# Patient Record
(demographics unavailable — no encounter records)

---

## 2024-12-15 NOTE — ASSESSMENT
[FreeTextEntry1] : 1) Neoplasm of unknown biological significance, on the right posterior shoulder - Lesion measured as above and photo taken for clinical correlation. - Suspicious for NMSC vs. ISK - Biopsy was performed today for histologic correlation. Will contact Patient with results and plan treatment considering histologic findings. - r/o NMSC vs. ISK  Shave Biopsy: All side effects including pain, bleeding, infection, scar, recurrence, nerve damage were discussed and consent was obtained. We anesthetized the area with 1% lidocaine/epinephrine. The lesion was biopsied with a Dermablade. Hemostasis was achieved with Drysol. Wound care were provided and the tissue was submitted to pathology for evaluation.  2) Actinic keratoses, face and L ear - We have discussed the nature and usual course of these lesions.  - Given broad actinic damage, recommended field therapy. Discussed in detail. Initially, Patient declined and cryotherapy was performed with 1 cycle to actinic keratoses (#18). However, after treatment, Patient and his wife opted for field therapy.  - After the holidays, start 5-FU to affected areas BID x 2 weeks. SED. Proper use discussed. Detailed instruction sheet provided.  - The procedure was well-tolerated and without complication. - We have discussed related skin care and cryotherapy aftercare. - RTC 6 weeks after completing 5-FU for f/u with Dr. Lancaster.   3) Atypical compound melanocytic proliferation associated with SK, R lateral back PRAME immunostain positive supportive of at least MIS, with entire lesion treated as melanoma extended to 0.3mm depth) - S/p excision 1/05/2024 - [facility]DX- class 1A (low risk).  - No evidence of recurrence. Continue to monitor. No repigmentation or nodularity. No lymphadenopathy on exam as above.  - Continue to follow with ophthalmology, dentist, and urologist.  - Continue to monitor. RTC 3 mos for FBSE with Dr. Lancaster.  4) MIS, right lower back, s/p excision 5/15/2024 - No evidence of recurrence. Continue to monitor. No repigmentation or nodularity. No lymphadenopathy on exam as above.  - Continue to follow with ophthalmology, dentist, and urologist.  - Continue to monitor. RTC 3 mos for FBSE with Dr. Lancaster.  5) History of non-melanoma skin cancer, extensive (see hpi) - No evidence of recurrence at prior sites.  - Sun protective measures reinforced - C/w q3 mo FBSE as above.   6)Screening exam for skin cancer (V76.43) (Z12.83) - ABCDEs of melanoma, sun safety with OTC SPF 30+, and self-skin checks reviewed - Counseled patient to notify us of any changing lesions  RTC 3 mos for FBSE as above.

## 2024-12-15 NOTE — HISTORY OF PRESENT ILLNESS
[FreeTextEntry1] : F/u melanoma; FBSE [de-identified] : 79M with a history of MMIS on the R lateral back s/p excision, MIS on the right lower back s/p excision, multiple NMSCs, and MGUS, last seen August 2024, here for q3 month skin exam. No new, evolving, or symptomatic lesions. Presenting today with his wife.   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

## 2024-12-15 NOTE — PHYSICAL EXAM
[Alert] : alert [Oriented x 3] : ~L oriented x 3 [Well Nourished] : well nourished [Conjunctiva Non-injected] : conjunctiva non-injected [No Visual Lymphadenopathy] : no visual  lymphadenopathy [No Clubbing] : no clubbing [No Edema] : no edema [No Bromhidrosis] : no bromhidrosis [Full Body Skin Exam Performed] : performed [No Chromhidrosis] : no chromhidrosis [FreeTextEntry3] : - 0.4 cm x 0.6 cm yellow, hyperkeratotic papule on the right posterior shoulder on an erythematous base (see photo, 12/15/2024) - Scattered, poorly circumscribed red erythematous, rough papules on the face, L ear helical root and superior antihelix - Many linear scars on the face and neck without pigmentation or nodularity - Several scattered, red, partially blanching papules on the trunk and extremities. - Scattered, well-demarcated stuck-on appearing brown plaques and papules on the trunk and extremities. - Fairly uniform and regular brown macules and papules on the trunk and extremities. - Linear scar R lower back w/o pigmentation or nodularity - Linear scar R lateral back w/o pigmentation or nodularity - No palpable lymphadenopathy of cervical, occipital, mandibular, clavicular, axillary, or inguinal lymph nodes.

## 2024-12-15 NOTE — HISTORY OF PRESENT ILLNESS
[FreeTextEntry1] : F/u melanoma; FBSE [de-identified] : 79M with a history of MMIS on the R lateral back s/p excision, MIS on the right lower back s/p excision, multiple NMSCs, and MGUS, last seen August 2024, here for q3 month skin exam. No new, evolving, or symptomatic lesions. Presenting today with his wife.   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

## 2025-02-10 NOTE — PHYSICAL EXAM
[Alert] : alert [Oriented x 3] : ~L oriented x 3 [Well Nourished] : well nourished [Conjunctiva Non-injected] : conjunctiva non-injected [No Visual Lymphadenopathy] : no visual  lymphadenopathy [No Clubbing] : no clubbing [No Edema] : no edema [No Bromhidrosis] : no bromhidrosis [No Chromhidrosis] : no chromhidrosis [FreeTextEntry3] : 0.8cm x 0.8cm pink scar with overlying scale at site of previous biopsy on R posterior shoulder

## 2025-02-10 NOTE — HISTORY OF PRESENT ILLNESS
[FreeTextEntry1] : Excision of an SCCis on R posterior shoulder [de-identified] : 03/23/2022 Mohs Consultation for BCC micronodular on the L upper eyelid 07/19/2022 Mohs surgery for BCC micronodular on the L upper eyelid 02/10/2025 Excision of an SCCis on R posterior shoulder  Referred by Dr. Lancaster; Biopsying Physician: Dr. Lancaster  We had the pleasure of seeing your patient for Mohs Micrographic Surgery.  ANA RYAN is a 76 year old M with PMH of b/l knee replacement (2009/2016), epilepsy, remote prostate cancer hx s/p radiation who presents for an SCCis arising in association with a hypertrophic AK on the R posterior shoulder.  He has had Mohs surgery in the past on other areas for which he has had plastic surgery repair.   Skin Cancer History: multiple NMSC x4 SH: Works as retired (worked for the [employer] [place]), seen in the office with his wife Upcoming travel plans: none  Allergies: NKDA Smoking: no Pertinent positives noted below  History of HIV or hepatitis: No Blood thinners: no Antibiotic Prophylaxis: None  Medical implants: None  The patient's review of systems questionnaire was reviewed. Education needs were identified. There were no barriers to learning.

## 2025-02-25 NOTE — HISTORY OF PRESENT ILLNESS
[FreeTextEntry1] : rpa: rash [de-identified] : 70M last seen 6 days ago by Dr. Alexander, presenting today for a rash on both arms and chest, very itchy. Started about three weeks ago. Started after applying testosterone gel to arms. Used once. No treatments tried. No recent illness.   Recent SCCis on the R posterior shoulder s/p excision 2/10/2025 by Dr. Alexander, s/p bx 12/2024 by Dr. Kong and myself. Pending pathology results.  07/19/2022 Mohs surgery for BCC micronodular on the L upper eyelid

## 2025-02-25 NOTE — HISTORY OF PRESENT ILLNESS
[FreeTextEntry1] : rpa: rash [de-identified] : 70M last seen 6 days ago by Dr. Alexander, presenting today for a rash on both arms and chest, very itchy. Started about three weeks ago. Started after applying testosterone gel to arms. Used once. No treatments tried. No recent illness.   Recent SCCis on the R posterior shoulder s/p excision 2/10/2025 by Dr. Alexander, s/p bx 12/2024 by Dr. Kong and myself. Pending pathology results.  07/19/2022 Mohs surgery for BCC micronodular on the L upper eyelid

## 2025-02-25 NOTE — ASSESSMENT
[FreeTextEntry1] : #Favor contact dermatitis, arms 2/2 to ACD from testosterone gel With #eczematous dermatitis on the chest, favor 2/2 to moderate #xerosis, chronic, not at treatment goal - New diagnosis with uncertain prognosis. - Diagnosis reviewed.  - Start TAC 0.1% ointment BID to affected areas on body for 2 weeks max. Proper medication use and side effects discussed, including cutaneous atrophy and striae. Avoid long-term use; do not use on face, axillae, groin. Do not use as moisturizer. - Gentle skincare discussed in detail.  RTC if no resolution in 4 wks

## 2025-02-25 NOTE — PHYSICAL EXAM
[FreeTextEntry3] : Focused skin exam performed  The relevant portions of the exam were performed today  AAOx3, NAD, well-appearing / pleasant Focused examination within normal limits with the exception of:  Well-demarcated clusters of pink scaly papules and macules on the bilateral arms, with few scattered on the chest, back clear with diffuse moderate xerosis

## 2025-02-25 NOTE — HISTORY OF PRESENT ILLNESS
[FreeTextEntry1] : rpa: rash [de-identified] : 70M last seen 6 days ago by Dr. Alexander, presenting today for a rash on both arms and chest, very itchy. Started about three weeks ago. Started after applying testosterone gel to arms. Used once. No treatments tried. No recent illness.   Recent SCCis on the R posterior shoulder s/p excision 2/10/2025 by Dr. Alexander, s/p bx 12/2024 by Dr. Kong and myself. Pending pathology results.  07/19/2022 Mohs surgery for BCC micronodular on the L upper eyelid

## 2025-04-02 NOTE — HISTORY OF PRESENT ILLNESS
[FreeTextEntry1] : F/u melanoma; FBSE [de-identified] : Mr. ANA RYAN is a 79 year old M with MGUS here for evaluation of below.    FBSE.  Spots scattered on body x years. Asymptomatic and unchanged. No alleviating/aggravating factors. Never been treated. Hx of melanoma, see below.   Derm hx: see below - Mild dysplastic nevus on R midback, bx 11/20/23, monitoring - Congenital nevus with moderate atypia on L mid-back; s/p biopsy 1/31/22; s/p excision March 2022 - hyperplastic AK on R mandible (Sept 2019) - Dermal scar on R distal foot  Personal hx of skin cancer: yes, see scanned media - Melanoma-in-situ on R lower back, biopsied 4/22/24, s/p excision 5/15/24 with clear margins - Malignant Melanoma R lateral back, atleast 0.3mm, s/p excision 01/05/2024 with clear margins - BCC on R anterior neck; s/p ED&C 10/2023 - BCC on L upper eyelid; s/p biopsy 1/31/22; s/p Mohs - SCC on L nose s/p Mohs >15 years ago - SCC on L lateral canthus s/p Mohs May 2021 FHx of skin cancer: yes Social Hx: here with wife Debbie who is also my patient

## 2025-04-02 NOTE — ASSESSMENT
[FreeTextEntry1] : #Multiple benign nevi #Solar lentigo #Screening exam for skin cancer - no suspicious lesions on exam today - TBSE performed today - Advised sun protection. Recommended OTC sunscreen products (EltaMD/Neutrogena/La Roche Posay), including SPF30+ with broadband UV protection as well as proper use. Discussed OTC sun protective clothing - Counseled patient to monitor for changes, including mole monitoring and self-skin exams  #Seborrheic Keratosis - These growths are benign - Related to genetics - these lesions run in families; NOT related to sun exposure - No treatment warranted unless inflamed; can use OTC Sarna lotion PRN itch  #History of melanoma x2 - w  #History of non-melanoma skin cancer - No evidence of recurrence - Sun protective measures reinforced - Recommend full body skin exam atleast annually  Actinic keratoses as above -Treated with cryotherapy x 2, side effects discussed including erythema and scarring, blister formation expected by patient, total freeze time 4 seconds. Wound care reviewed withpatient. Risk of hypo/hyperpigmentation reviewed with patient, and possible need for re-treatment and / or future biopsy also reviewed with patient - total # treated- 1  rash education unclear etiology; has had extensive work up  xerosis -education -gentle skin care reviewed

## 2025-04-02 NOTE — PHYSICAL EXAM
[Alert] : alert [Oriented x 3] : ~L oriented x 3 [Well Nourished] : well nourished [Conjunctiva Non-injected] : conjunctiva non-injected [No Visual Lymphadenopathy] : no visual  lymphadenopathy [No Clubbing] : no clubbing [No Edema] : no edema [No Bromhidrosis] : no bromhidrosis [No Chromhidrosis] : no chromhidrosis [Full Body Skin Exam Performed] : performed [FreeTextEntry3] : General: Alert and oriented, in NAD.  All of the following were examined and were within normal limits, except as noted:   Scalp: Face, including eyelids, nose, lips, ears, oropharynx: Neck: Chest/Back/Abdomen: Bilateral Arms/Hands: Bilateral Legs/Feet: Buttocks, Genitalia, Anus/perineum:  	 Hair, Nails, Oral Mucosa, Eyes:   Lymph nodes:  - pink gritty papules on face, ears, >> forearms. Total 25 - SKs - lentigines - WHSS on R lower back and R lateral back

## 2025-06-02 NOTE — DISCUSSION/SUMMARY
[FreeTextEntry1] : Mr. Howard is a 79 year-old man who presented today for paresthesia to BL LE. The patient's symptoms and physical examination findings suggest peripheral neuropathy, possibly related to his MGUS. There is also a possibility of restless leg syndrome. Vascular issues are less likely but not completely ruled out. The plan is to start with conservative management using supplements and medication, while pursuing further diagnostic testing to clarify the underlying cause of the symptoms. - Plan : - Start Magnesium glycinate supplement, 400mg at bedtime - Prescribe Gabapentin 100mg capsules, starting with 1 capsule at bedtime, increasing to 2 capsules if tolerated - Order EMG to evaluate for peripheral neuropathy - Order blood work including ferritin levels and repeat B12 - Follow up on upcoming vascular studies - Consider lumbar spine MRI if symptoms persist - Follow up in 2 months or sooner if symptoms worsen or no improvement with medication

## 2025-06-02 NOTE — CONSULT LETTER
[Dear  ___] : Dear  [unfilled], [Courtesy Letter:] : I had the pleasure of seeing your patient, [unfilled], in my office today. [Please see my note below.] : Please see my note below. [Sincerely,] : Sincerely, [FreeTextEntry3] : Thu Juan NP [facility] at [address] [address] Phone: [phone]; Fax: [phone]

## 2025-06-02 NOTE — CONSULT LETTER
[Dear  ___] : Dear  [unfilled], [Courtesy Letter:] : I had the pleasure of seeing your patient, [unfilled], in my office today. [Please see my note below.] : Please see my note below. [Sincerely,] : Sincerely, [FreeTextEntry3] : Thu Juan NP [facility] at [address] [address] Phone: [phone]; Fax: [phone]

## 2025-06-02 NOTE — PHYSICAL EXAM
[FreeTextEntry1] : GENERAL PHYSICAL EXAM: GEN: no distress, normal affect EYES: sclera white, conjunctiva clear, no nystagmus CV: normal rhythm PULM: no respiratory distress, normal rhythm and effort EXT: no edema, no cyanosis SKIN: warm, dry, no rash or lesion on exposed skin   NEUROLOGICAL EXAM: Mental Status Orientation: alert and oriented to person, place, time, and situation Language: clear and fluent, intact comprehension and repetition   Cranial Nerves II: visual fields full to confrontation III, IV, VI: PERRL, EOMI V, VII: facial sensation and movement intact and symmetric VIII: hearing intact IX, X: uvula midline, soft palate elevates normally XI: BL shoulder shrug intact XII: tongue midline   Motor Shoulder abd: 5 (R), 5 (L) EF/EE: 5 (R), 5 (L) WF/WE: 5 (R), 5 (L) hand : 5 (R), 5 (L) HF/HE: 5 (R), 5 (L) KF/KE: 5 (R), 5 (L) DF/PF: 5 (R), 5 (L) Tone and bulk are normal in upper and lower limbs No pronator drift   Sensation Intact to light touch in all 4 EXTs Diminished sensation to sharp/dull touch and vibration in lower extremities.    Reflex 2+ in BL biceps, brachioradialis, patella   Coordination Normal FTN bilaterally Able to perform rapid, alternating movements   Gait Normal stance, stride, and pivot turn Tandem walk intact Negative Romberg

## 2025-06-02 NOTE — HISTORY OF PRESENT ILLNESS
[FreeTextEntry1] : Mr. Howard is a 79-year-old male with PMH MGUS, seizures, prostate CA, torn tibia (5 years ago, wears brace) presenting with nightly burning sensation from waist down and ice cold feet. Symptoms started about 7 months ago. Patient has a history of MGUS, prostate cancer, and seizures. No history of diabetes or back issues.  Patient experiences burning sensation from waist down in BL legs and temperature change to feet every night when laying down for bed. Symptoms started about 7 months ago. The legs feel hot to touch, while feet are objectively cold. Symptoms improve upon getting up and moving around. Patient also experiences dizziness for a few seconds when laying down in bed. First few steps in the morning are difficult, requiring caution. During the day, the patient feels warm in the thighs. No feeling of paresthesia or burning in feet during the day. Patient has been wearing socks to bed and using cold water to alleviate symptoms. Recent MRI of the brain was reportedly normal. B12 level from May 2024 was 1,049 (normal). CBC showed normal hemoglobin and hematocrit. Platelets were 124 (slightly low).  ROS: Positive for dizziness when laying down, difficulty with first few steps in the morning. Negative for headaches, vision loss, or weakness.

## 2025-06-26 NOTE — HISTORY OF PRESENT ILLNESS
[FreeTextEntry1] : This is a 79M with MGUS (2.5 years), malignant melanoma (in 2024), epilepsy (diagnosed in 2004, controlled on Zonisamide), and left ankle injury c/b left ankle weakness and AFO who was diagnosed with axonal neuropathy and comes in to [facility] care.   Over the last 8 months the patient has developed burning in the legs. He and his wife describe that at nighttime his legs get very hot to touch and exquisitely tender to touch. He will have a hard time falling asleep and has to walk around to give his legs a break from the pain. His wife notes that when she touches his legs they are hot to feel. Over the daytime, though, symptoms are significantly milder. He has some discomfort but tolerable. He also notes imbalance as he moves around more tentatively.   He also notes that over the last year he has developed night sweats. In the silvino he would wake up soaked in sweat. Furthermore, his wife notes that over the last couple of months he has been significantly more fatigued than usual. Has not had any weight loss.  First went to Endocrinology with workup geared towards male menopause which was largely unremarkable aside from elevated BUN. Went to nephrology who diagnosed him with CKD. The patient went to Hematologist who got MRI Brain which was reportedly unremarkable (not here for review). Saw vascular surgery and diagnosed with venous insufficiency, treating with conservative measures. He then saw BAIRON Juan who scheduled an EMG with Dr. Bueno. The EMG showed axonal sensorimotor neuropathy.  He was given gabapentin 300mg qhs with some relief. He no longer feels that he has to get up all night to find ways to cool off the legs though he is still uncomfortable. He doesn't feel that his symptoms have progressed much since it began.

## 2025-06-26 NOTE — PHYSICAL EXAM
[Person] : oriented to person [Place] : oriented to place [Time] : oriented to time [Fluency] : fluency intact [Cranial Nerves Optic (II)] : visual acuity intact bilaterally,  visual fields full to confrontation, pupils equal round and reactive to light [Cranial Nerves Oculomotor (III)] : extraocular motion intact [Cranial Nerves Trigeminal (V)] : facial sensation intact symmetrically [Cranial Nerves Facial (VII)] : face symmetrical [Cranial Nerves Vestibulocochlear (VIII)] : hearing was intact bilaterally [Cranial Nerves Glossopharyngeal (IX)] : tongue and palate midline [Cranial Nerves Hypoglossal (XII)] : there was no tongue deviation with protrusion [Cranial Nerves Accessory (XI - Cranial And Spinal)] : head turning and shoulder shrug symmetric [2+] : Patella left 2+ [0] : Ankle jerk left 0 [FreeTextEntry6] : Full strength aside from left ankle weakness in all directions [FreeTextEntry8] : Narrow based, left foot AFO

## 2025-06-26 NOTE — ASSESSMENT
[FreeTextEntry1] : This is a 79M with PMH malignant melanoma s/p resection, MGUS, and epilepsy who comes in with nightly leg burning. EMG with axonal neuropathy.   While some of the patient's symptoms are consistent with neuropathy, actual temperature changes in the legs, night sweats, and preserved knee reflexes are atypical. Would want to rule out systemic malignancy/paraneoplastic process as well as broaden his neuropathy workup.  - Whole body PET (last done 2 years ago, prior to symptom onset) - CBC, CMP, ACE, AHSAN, ESR, CRP, HIV, Lyme, Sjogren's, Syphilis, B6 - Increase gabapentin to 400mg qhs, can increase every week by 100mg up to 600mg qhs  F/u 3 months

## 2025-07-02 NOTE — HISTORY OF PRESENT ILLNESS
[FreeTextEntry1] : F/u melanoma; FBSE [de-identified] : Mr. ANA RYAN is a 79 year old M with MGUS and recently diagnosed neuropathy (undergoing workup) here for evaluation of below.   FBSE.  Spots scattered on body x years. Asymptomatic and unchanged. No alleviating/aggravating factors. Never been treated. Hx of melanoma, see below.   Derm hx: see below - Mild dysplastic nevus on R midback, bx 11/20/23, monitoring - Congenital nevus with moderate atypia on L mid-back; s/p biopsy 1/31/22; s/p excision March 2022 - hyperplastic AK on R mandible (Sept 2019) - Dermal scar on R distal foot  Personal hx of skin cancer: yes, see scanned media - SCC on R posterior shoulder, bx 12/13/24, s/p excision with clear margins - Melanoma-in-situ on R lower back, biopsied 4/22/24, s/p excision 5/15/24 with clear margins - Malignant Melanoma R lateral back, atleast 0.3mm, s/p excision 1/5/2024 with clear margins - BCC on R anterior neck; s/p ED&C 10/2023 - BCC on L upper eyelid; s/p biopsy 1/31/22; s/p Mohs - SCC on L nose s/p Mohs >15 years ago - SCC on L lateral canthus s/p Mohs May 2021 FHx of skin cancer: yes Social Hx: here with wife Debbie who is also my patient

## 2025-07-02 NOTE — PHYSICAL EXAM
[Alert] : alert [Oriented x 3] : ~L oriented x 3 [Well Nourished] : well nourished [Conjunctiva Non-injected] : conjunctiva non-injected [No Visual Lymphadenopathy] : no visual  lymphadenopathy [No Clubbing] : no clubbing [No Edema] : no edema [No Bromhidrosis] : no bromhidrosis [No Chromhidrosis] : no chromhidrosis [Full Body Skin Exam Performed] : performed [FreeTextEntry3] : General: Alert and oriented, in NAD.  All of the following were examined and were within normal limits, except as noted:   Scalp: Face, including eyelids, nose, lips, ears, oropharynx: Neck: Chest/Back/Abdomen: Bilateral Arms/Hands: Bilateral Legs/Feet: Buttocks, Genitalia, Anus/perineum:  	 Hair, Nails, Oral Mucosa, Eyes:   Lymph nodes:  - pink gritty papules on face, neck >> forearms. Total 16 - SKs - lentigines - WHSS on R lower back and R lateral back

## 2025-07-02 NOTE — ASSESSMENT
[FreeTextEntry1] : #Multiple benign nevi #Solar lentigo #Screening exam for skin cancer - no suspicious lesions on exam today - TBSE performed today - Advised sun protection. Recommended OTC sunscreen products (EltaMD/Neutrogena/La Roche Posay), including SPF30+ with broadband UV protection as well as proper use. Discussed OTC sun protective clothing - Counseled patient to monitor for changes, including mole monitoring and self-skin exams  #Seborrheic Keratosis - These growths are benign - Related to genetics - these lesions run in families; NOT related to sun exposure - No treatment warranted unless inflamed; can use OTC Sarna lotion PRN itch  #History of melanoma x2 CastleDX test for melanoma (0.3mm BD) on R lateral back was class 1A (low risk) - No evidence of recurrence or repigmentation, no LAD - Denies fevers, chills, weight loss - Recommend to f/u with ophthalmology, dentist, and Gynec annually - Discussed mole monitoring and sun protective measures  #History of non-melanoma skin cancer - No evidence of recurrence - Sun protective measures reinforced - Recommend full body skin exam atleast annually  #Actinic Keratoses x16 - Patient was verbally consented and the lesions identified above were treated with liquid nitrogen freeze, thaw, freeze x 10 seconds each cycle x 2. Side effects include blister formation, hypopigmentation, and scarring. The patient tolerated the procedure well.  Wound care instructions, care of a blister with vaseline, signs and symptoms of infection were discussed in full.  The patient denies any questions at this time.

## 2025-07-07 NOTE — ASSESSMENT
[FreeTextEntry1] : He is clinically stable A f/u echocardiogram was ordered to re assess his aorta  A calcium core was discussed

## 2025-07-07 NOTE — REASON FOR VISIT
[FreeTextEntry1] : The patient has a history of sinus bradycardia w/o AV block (HR augments with mild activity in the office) He also has a mildly enlarged aorta (4cm on echo from last year) He has an idiopathic DVT diagnosed 6 years ago; he was on a short course of anticoagulation for this He has a history of seizures  He is w/o specific cardiac complaints His cardiac exam is benign

## 2025-07-14 NOTE — HISTORY OF PRESENT ILLNESS
[FreeTextEntry1] : 80 yo male presents to the office to evaluate bilateral lower extremities. History of remote right leg DVT, venous insufficiency and lymphedema. Awaiting SCD approval. Pt continues to experience bilateral leg swelling despite conservative measures with leg elevation and compression stockings. Swelling is worse after prolonged standing and walking. Worse by afternoon. Slight improvement with leg elevation and compression stockings. Denies claudication, rest pain or wounds. Nonsmoker.

## 2025-07-14 NOTE — PHYSICAL EXAM
[1+] : left 1+ [2+] : left 2+ [Ankle Swelling (On Exam)] : present [Ankle Swelling Bilaterally] : bilaterally  [Varicose Veins Of Lower Extremities] : bilaterally [] : bilaterally [Ankle Swelling On The Right] : mild [No Rash or Lesion] : No rash or lesion [Alert] : alert [Oriented to Person] : oriented to person [Oriented to Place] : oriented to place [Oriented to Time] : oriented to time [Calm] : calm [de-identified] : NAD [de-identified] : NCAT [de-identified] : unlabored breathing [FreeTextEntry1] : bilateral lower extremities soft, warm and nontender moderate leg edema R>L venous stasis changes with dry skin and hyperpigmentation no wounds

## 2025-07-14 NOTE — ASSESSMENT
[FreeTextEntry1] : Impression - venous insufficiency and lymphedema   Plan Conservative management with leg elevation, exercise regimen, knee high 20-30 mm hg compression stockings, calf muscle exercises SCD referral Return to office in 6 months January 2026 to evaluate bilateral lower extremities with venous doppler   Leg measurements right ankle 10.5 inches right calf 18 inches right thigh 21.5 inches left ankle 10.5 inches left calf 16.5 inches. left thigh 22.5 inches [Arterial/Venous Disease] : arterial/venous disease [Other: _____] : [unfilled]